# Patient Record
Sex: FEMALE | Race: ASIAN | NOT HISPANIC OR LATINO
[De-identification: names, ages, dates, MRNs, and addresses within clinical notes are randomized per-mention and may not be internally consistent; named-entity substitution may affect disease eponyms.]

---

## 2022-07-08 ENCOUNTER — NON-APPOINTMENT (OUTPATIENT)
Age: 27
End: 2022-07-08

## 2022-07-08 PROBLEM — Z00.00 ENCOUNTER FOR PREVENTIVE HEALTH EXAMINATION: Status: ACTIVE | Noted: 2022-07-08

## 2022-07-09 ENCOUNTER — NON-APPOINTMENT (OUTPATIENT)
Age: 27
End: 2022-07-09

## 2022-07-12 ENCOUNTER — OUTPATIENT (OUTPATIENT)
Dept: OUTPATIENT SERVICES | Facility: HOSPITAL | Age: 27
LOS: 1 days | End: 2022-07-12

## 2022-07-12 ENCOUNTER — RESULT REVIEW (OUTPATIENT)
Age: 27
End: 2022-07-12

## 2022-07-12 ENCOUNTER — APPOINTMENT (OUTPATIENT)
Dept: ULTRASOUND IMAGING | Facility: CLINIC | Age: 27
End: 2022-07-12

## 2022-07-12 PROCEDURE — 76856 US EXAM PELVIC COMPLETE: CPT | Mod: 26,59

## 2022-07-12 PROCEDURE — 76830 TRANSVAGINAL US NON-OB: CPT | Mod: 26

## 2022-07-15 PROBLEM — R19.00 PELVIC MASS: Status: ACTIVE | Noted: 2022-07-15

## 2022-07-18 ENCOUNTER — NON-APPOINTMENT (OUTPATIENT)
Age: 27
End: 2022-07-18

## 2022-07-19 ENCOUNTER — APPOINTMENT (OUTPATIENT)
Dept: GYNECOLOGIC ONCOLOGY | Facility: CLINIC | Age: 27
End: 2022-07-19

## 2022-07-19 VITALS
WEIGHT: 110 LBS | BODY MASS INDEX: 19.49 KG/M2 | SYSTOLIC BLOOD PRESSURE: 111 MMHG | DIASTOLIC BLOOD PRESSURE: 72 MMHG | HEIGHT: 63 IN | HEART RATE: 73 BPM

## 2022-07-19 DIAGNOSIS — R19.00 INTRA-ABDOMINAL AND PELVIC SWELLING, MASS AND LUMP, UNSPECIFIED SITE: ICD-10-CM

## 2022-07-19 DIAGNOSIS — R10.2 PELVIC AND PERINEAL PAIN: ICD-10-CM

## 2022-07-19 DIAGNOSIS — Z80.0 FAMILY HISTORY OF MALIGNANT NEOPLASM OF DIGESTIVE ORGANS: ICD-10-CM

## 2022-07-19 DIAGNOSIS — Z80.8 FAMILY HISTORY OF MALIGNANT NEOPLASM OF OTHER ORGANS OR SYSTEMS: ICD-10-CM

## 2022-07-19 PROCEDURE — 99204 OFFICE O/P NEW MOD 45 MIN: CPT

## 2022-07-19 RX ORDER — NORELGESTROMIN AND ETHINYL ESTRADIOL 150; 35 UG/D; UG/D
150-35 PATCH TRANSDERMAL
Refills: 0 | Status: ACTIVE | COMMUNITY

## 2022-07-26 ENCOUNTER — OUTPATIENT (OUTPATIENT)
Dept: OUTPATIENT SERVICES | Facility: HOSPITAL | Age: 27
LOS: 1 days | End: 2022-07-26

## 2022-07-26 VITALS
WEIGHT: 108.03 LBS | SYSTOLIC BLOOD PRESSURE: 100 MMHG | RESPIRATION RATE: 18 BRPM | HEART RATE: 62 BPM | OXYGEN SATURATION: 99 % | DIASTOLIC BLOOD PRESSURE: 68 MMHG | HEIGHT: 63 IN | TEMPERATURE: 99 F

## 2022-07-26 DIAGNOSIS — N83.209 UNSPECIFIED OVARIAN CYST, UNSPECIFIED SIDE: ICD-10-CM

## 2022-07-26 LAB
BLD GP AB SCN SERPL QL: NEGATIVE — SIGNIFICANT CHANGE UP
HCG UR QL: NEGATIVE — SIGNIFICANT CHANGE UP
HCT VFR BLD CALC: 40.6 % — SIGNIFICANT CHANGE UP (ref 34.5–45)
HGB BLD-MCNC: 12.2 G/DL — SIGNIFICANT CHANGE UP (ref 11.5–15.5)
MCHC RBC-ENTMCNC: 25.8 PG — LOW (ref 27–34)
MCHC RBC-ENTMCNC: 30 GM/DL — LOW (ref 32–36)
MCV RBC AUTO: 86 FL — SIGNIFICANT CHANGE UP (ref 80–100)
NRBC # BLD: 0 /100 WBCS — SIGNIFICANT CHANGE UP
NRBC # FLD: 0 K/UL — SIGNIFICANT CHANGE UP
PLATELET # BLD AUTO: 293 K/UL — SIGNIFICANT CHANGE UP (ref 150–400)
RBC # BLD: 4.72 M/UL — SIGNIFICANT CHANGE UP (ref 3.8–5.2)
RBC # FLD: 15.4 % — HIGH (ref 10.3–14.5)
RH IG SCN BLD-IMP: POSITIVE — SIGNIFICANT CHANGE UP
WBC # BLD: 6.83 K/UL — SIGNIFICANT CHANGE UP (ref 3.8–10.5)
WBC # FLD AUTO: 6.83 K/UL — SIGNIFICANT CHANGE UP (ref 3.8–10.5)

## 2022-07-26 RX ORDER — SODIUM CHLORIDE 9 MG/ML
1000 INJECTION, SOLUTION INTRAVENOUS
Refills: 0 | Status: DISCONTINUED | OUTPATIENT
Start: 2022-08-04 | End: 2022-08-18

## 2022-07-26 NOTE — H&P PST ADULT - HISTORY OF PRESENT ILLNESS
Patient is 26 year old female with preop dx of ovarian cyst . Patient is scheduled for robotic assisted laparoscopic left ovarian cystectomy dilation curettage.

## 2022-07-26 NOTE — H&P PST ADULT - ASSESSMENT
preop dx of ovarian cyst . Patient is scheduled for robotic assisted laparoscopic left ovarian cystectomy dilation curettage.

## 2022-07-26 NOTE — H&P PST ADULT - NEGATIVE FEMALE-SPECIFIC SYMPTOMS
no irregular menses/no vaginal discharge/no dysmenorrhea/no amenorrhea/no spotting/no abnormal vaginal bleeding/no pelvic pain/not applicable

## 2022-07-26 NOTE — H&P PST ADULT - PROBLEM SELECTOR PLAN 1
Patient tentatively scheduled for robotic assisted laparoscopic left ovarian cystectomy dilation curettage on 8/4/22    Pre-op instructions provided. Pt given verbal and written instructions with teach back on chlorhexidine shampoo and Pepcid. Pt verbalized understanding with return demonstration.    Pt has a scheduled preop COVID test.

## 2022-07-26 NOTE — H&P PST ADULT - NSICDXFAMILYHX_GEN_ALL_CORE_FT
FAMILY HISTORY:  Father  Still living? Unknown  FHx: stomach cancer, Age at diagnosis: Age Unknown

## 2022-07-31 ENCOUNTER — NON-APPOINTMENT (OUTPATIENT)
Age: 27
End: 2022-07-31

## 2022-08-03 ENCOUNTER — TRANSCRIPTION ENCOUNTER (OUTPATIENT)
Age: 27
End: 2022-08-03

## 2022-08-03 NOTE — ASU PATIENT PROFILE, ADULT - NSICDXPASTMEDICALHX_GEN_ALL_CORE_FT
PAST MEDICAL HISTORY:  Intra-abdominal and pelvic swelling, mass and lump, unspecified site     Ovarian cyst left

## 2022-08-04 ENCOUNTER — RESULT REVIEW (OUTPATIENT)
Age: 27
End: 2022-08-04

## 2022-08-04 ENCOUNTER — OUTPATIENT (OUTPATIENT)
Dept: OUTPATIENT SERVICES | Facility: HOSPITAL | Age: 27
LOS: 1 days | Discharge: HOME | End: 2022-08-04

## 2022-08-04 ENCOUNTER — APPOINTMENT (OUTPATIENT)
Dept: GYNECOLOGIC ONCOLOGY | Facility: HOSPITAL | Age: 27
End: 2022-08-04

## 2022-08-04 ENCOUNTER — TRANSCRIPTION ENCOUNTER (OUTPATIENT)
Age: 27
End: 2022-08-04

## 2022-08-04 VITALS
HEART RATE: 60 BPM | RESPIRATION RATE: 14 BRPM | WEIGHT: 108.03 LBS | OXYGEN SATURATION: 100 % | SYSTOLIC BLOOD PRESSURE: 106 MMHG | TEMPERATURE: 98 F | HEIGHT: 63 IN | DIASTOLIC BLOOD PRESSURE: 59 MMHG

## 2022-08-04 VITALS
OXYGEN SATURATION: 97 % | SYSTOLIC BLOOD PRESSURE: 101 MMHG | HEART RATE: 77 BPM | DIASTOLIC BLOOD PRESSURE: 66 MMHG | RESPIRATION RATE: 16 BRPM

## 2022-08-04 DIAGNOSIS — N83.209 UNSPECIFIED OVARIAN CYST, UNSPECIFIED SIDE: ICD-10-CM

## 2022-08-04 LAB — HCG UR QL: NEGATIVE — SIGNIFICANT CHANGE UP

## 2022-08-04 PROCEDURE — 58662 LAPAROSCOPY EXCISE LESIONS: CPT

## 2022-08-04 PROCEDURE — 88305 TISSUE EXAM BY PATHOLOGIST: CPT | Mod: 26,59

## 2022-08-04 PROCEDURE — 88305 TISSUE EXAM BY PATHOLOGIST: CPT | Mod: 26

## 2022-08-04 PROCEDURE — 88112 CYTOPATH CELL ENHANCE TECH: CPT | Mod: 26

## 2022-08-04 PROCEDURE — 88307 TISSUE EXAM BY PATHOLOGIST: CPT | Mod: 26

## 2022-08-04 PROCEDURE — 58120 DILATION AND CURETTAGE: CPT

## 2022-08-04 RX ORDER — HYDROMORPHONE HYDROCHLORIDE 2 MG/ML
0.5 INJECTION INTRAMUSCULAR; INTRAVENOUS; SUBCUTANEOUS
Refills: 0 | Status: DISCONTINUED | OUTPATIENT
Start: 2022-08-04 | End: 2022-08-04

## 2022-08-04 RX ORDER — NORELGESTROMIN AND ETHINYL ESTRADIOL 150; 35 UG/D; UG/D
1 PATCH TRANSDERMAL
Qty: 0 | Refills: 0 | DISCHARGE

## 2022-08-04 RX ORDER — IBUPROFEN 200 MG
1 TABLET ORAL
Qty: 0 | Refills: 0 | DISCHARGE

## 2022-08-04 RX ORDER — ACETAMINOPHEN 500 MG
2 TABLET ORAL
Qty: 0 | Refills: 0 | DISCHARGE

## 2022-08-04 RX ORDER — OXYCODONE HYDROCHLORIDE 5 MG/1
1 TABLET ORAL
Qty: 4 | Refills: 0
Start: 2022-08-04

## 2022-08-04 RX ORDER — SODIUM CHLORIDE 9 MG/ML
1000 INJECTION, SOLUTION INTRAVENOUS
Refills: 0 | Status: DISCONTINUED | OUTPATIENT
Start: 2022-08-04 | End: 2022-08-18

## 2022-08-04 RX ORDER — ONDANSETRON 8 MG/1
4 TABLET, FILM COATED ORAL ONCE
Refills: 0 | Status: COMPLETED | OUTPATIENT
Start: 2022-08-04 | End: 2022-08-04

## 2022-08-04 RX ORDER — OXYCODONE HYDROCHLORIDE 5 MG/1
5 TABLET ORAL ONCE
Refills: 0 | Status: DISCONTINUED | OUTPATIENT
Start: 2022-08-04 | End: 2022-08-04

## 2022-08-04 RX ADMIN — HYDROMORPHONE HYDROCHLORIDE 0.5 MILLIGRAM(S): 2 INJECTION INTRAMUSCULAR; INTRAVENOUS; SUBCUTANEOUS at 14:42

## 2022-08-04 RX ADMIN — SODIUM CHLORIDE 125 MILLILITER(S): 9 INJECTION, SOLUTION INTRAVENOUS at 14:47

## 2022-08-04 RX ADMIN — HYDROMORPHONE HYDROCHLORIDE 0.5 MILLIGRAM(S): 2 INJECTION INTRAMUSCULAR; INTRAVENOUS; SUBCUTANEOUS at 15:38

## 2022-08-04 RX ADMIN — OXYCODONE HYDROCHLORIDE 5 MILLIGRAM(S): 5 TABLET ORAL at 15:38

## 2022-08-04 RX ADMIN — OXYCODONE HYDROCHLORIDE 5 MILLIGRAM(S): 5 TABLET ORAL at 14:44

## 2022-08-04 RX ADMIN — ONDANSETRON 4 MILLIGRAM(S): 8 TABLET, FILM COATED ORAL at 14:47

## 2022-08-04 NOTE — BRIEF OPERATIVE NOTE - NSICDXBRIEFPROCEDURE_GEN_ALL_CORE_FT
PROCEDURES:  D&C (dilatation and curettage, scraping of uterus) 04-Aug-2022 14:20:46  Bernie Eller  Robot-assisted left ovarian cystectomy 04-Aug-2022 14:21:11  Bernie Eller

## 2022-08-04 NOTE — ASU DISCHARGE PLAN (ADULT/PEDIATRIC) - FOLLOW UP APPOINTMENTS
may also call Recovery Room (PACU) 24/7 @ [phone]/[facility]

## 2022-08-04 NOTE — ASU DISCHARGE PLAN (ADULT/PEDIATRIC) - NS MD DC FALL RISK RISK
For information on Fall & Injury Prevention, visit: [email or web address] OR  [email or web address] OR  https://www.cdc.gov/steadi/patient.html

## 2022-08-04 NOTE — ASU DISCHARGE PLAN (ADULT/PEDIATRIC) - CARE PROVIDER_API CALL
Kathleen Chaparro)  Gynecologic Oncology; Obstetrics and Gynecology  [address] [place] [address]  Phone: [phone]  Fax: [phone]  Follow Up Time: 2 weeks

## 2022-08-04 NOTE — PROGRESS NOTE ADULT - ASSESSMENT
28 yo female s/p Robot-assisted left ovarian cystectomy and D&C in stable condition.  -LR@125  -Regular diet  -DTV by 8pm  -discharge home once ambulating and voiding without difficulty  -F/U with Dr. Chaparro in 2 weeks    Carlos Cary PA-C  #[id number]

## 2022-08-04 NOTE — BRIEF OPERATIVE NOTE - OPERATION/FINDINGS
EUA: Normal external female genitalia. Small mobile anteverted uterus, left adnexal/midline abdominal fullness appreciated.   Intrabdominal findings: Grossly normal appearing liver, omentum and bowel.  Uterus smooth in appearance. Grossly normal bilateral fallopian tubes. Left ovary with 10cm dermoid cyst containing hair, mucinous fluid. Normal right ovary.

## 2022-08-04 NOTE — ASU DISCHARGE PLAN (ADULT/PEDIATRIC) - NURSING INSTRUCTIONS
You received IV Tylenol for pain management at 1:15PM. Please DO NOT take any Tylenol (Acetaminophen) containing products, such as Vicodin, Percocet, Excedrin, and cold medications for the next 6 hours (until 7;15 PM). DO NOT TAKE MORE THAN 3000 MG OF TYLENOL in a 24 hour period. You received IV Tylenol for pain management at 1:15PM. Please DO NOT take any Tylenol (Acetaminophen) containing products, such as Vicodin, Percocet, Excedrin, and cold medications for the next 6 hours (until 7;15 PM). DO NOT TAKE MORE THAN 3000 MG OF TYLENOL in a 24 hour period.. When taking pain meds - take with food and know it may cause constipation and nausea - Do NOT drive while on narcotics. You received IV Tylenol for pain management at 1:15PM. Please DO NOT take any Tylenol (Acetaminophen) containing products, such as Vicodin, Percocet, Excedrin, and cold medications for the next 6 hours (until 7;15 PM). DO NOT TAKE MORE THAN 3000 MG OF TYLENOL in a 24 hour period.. You received a dose of Oxycodone at When taking pain meds - take with food and know it may cause constipation and nausea - Do NOT drive while on narcotics.

## 2022-08-04 NOTE — ASU DISCHARGE PLAN (ADULT/PEDIATRIC) - ASU DC SPECIAL INSTRUCTIONSFT
Postoperative Instructions      Pain control: For pain control, take the following   1. Motrin 600mg four times a day, take with food  2. Add Tylenol 975 four times a day, alternated with motrin  3. Add oxycodone as needed for severe pain not managed well by motrin and tylenol. A prescription has been sent to your pharmacy on file.     Motrin and Tylenol can be obtained over the counter.    Postoperative restrictions: Do not drive or make important decisions for 24 hours after anesthesia. You may feel drowsy for 24 hours and should have a responsible adult with you during that time. Nothing in the vagina (tampons, sexual intercourse), No tub baths, pools or hot tubs for 6 weeks (showers are ok!). No lifting anything heavier than 15 lbs, no strenuous exercise for 6 weeks after surgery. Do not pull or cut any stitches that you see around your incisions.    Vaginal bleeding: Spotting and intermittent passage of blood clots per vagina is normal in first few weeks after surgery. If you are soaking 1 pad per hour, that is not normal and you should notify Dr. Chaparro's office and seek medical attention right away.     Vaginal discharge: Vaginal discharge (all colors) is normal.     Signs of Infection: Some postoperative pain and discomfort is normal. However, if you experience any of the following, you may be developing an infection and should be seen by your doctor: pain that does not get better with the oral medications listed above, fever greater than 100.4F, foul smelling discharge coming from the surgical site, nausea and vomiting that does not stop (especially if you are unable to tolerate oral intake), or inability to urinate. If you experience any of these symptoms, call your doctor's office to be seen right away.

## 2022-08-04 NOTE — PROGRESS NOTE ADULT - SUBJECTIVE AND OBJECTIVE BOX
GYNECOLOGIC ONCOLOGY PA POST OP NOTE    Pt seen and examined at bedside, sitting up,  at bedside. Patient denies headache, dizziness, nausea, vomiting, chest pain and sob. Bonilla removed in OR->DTV. Patient is tolerating water/crackers.     OBJECTIVE:     VITALS:  T(F): 97.6 (08-04-22 @ 16:00), Max: 97.9 (08-04-22 @ 08:40)  HR: 73 (08-04-22 @ 17:00) (60 - 91)  BP: 98/59 (08-04-22 @ 17:00) (98/59 - 127/85)  RR: 16 (08-04-22 @ 17:00) (12 - 20)  SpO2: 98% (08-04-22 @ 17:00) (97% - 100%)  Wt(kg): --    I&O's Summary    04 Aug 2022 07:01  -  04 Aug 2022 17:41  --------------------------------------------------------  IN: 125 mL / OUT: 0 mL / NET: 125 mL        MEDICATIONS  (STANDING):  lactated ringers. 1000 milliLiter(s) (30 mL/Hr) IV Continuous <Continuous>  lactated ringers. 1000 milliLiter(s) (125 mL/Hr) IV Continuous <Continuous>    MEDICATIONS  (PRN):  HYDROmorphone  Injectable 0.5 milliGRAM(s) IV Push every 10 minutes PRN Severe Pain (7 - 10)      Physical Exam:  Constitutional: NAD  Pulmonary: clear to auscultation bilaterally   Cardiovascular: Regular rate and rhythm   Abdomen: soft, non-distended, appropriate tenderness, scope sites C/D/I  Extremities: no lower extremity edema or calve tenderness

## 2022-08-05 ENCOUNTER — NON-APPOINTMENT (OUTPATIENT)
Age: 27
End: 2022-08-05

## 2022-08-05 LAB — NON-GYNECOLOGICAL CYTOLOGY STUDY: SIGNIFICANT CHANGE UP

## 2022-08-11 LAB — SURGICAL PATHOLOGY STUDY: SIGNIFICANT CHANGE UP

## 2022-08-22 PROBLEM — N83.209 OVARIAN CYST: Status: ACTIVE | Noted: 2022-07-08

## 2022-08-24 ENCOUNTER — APPOINTMENT (OUTPATIENT)
Dept: GYNECOLOGIC ONCOLOGY | Facility: CLINIC | Age: 27
End: 2022-08-24

## 2022-08-24 VITALS — HEART RATE: 89 BPM | SYSTOLIC BLOOD PRESSURE: 115 MMHG | DIASTOLIC BLOOD PRESSURE: 77 MMHG

## 2022-08-24 DIAGNOSIS — N83.209 UNSPECIFIED OVARIAN CYST, UNSPECIFIED SIDE: ICD-10-CM

## 2022-08-24 PROCEDURE — 99024 POSTOP FOLLOW-UP VISIT: CPT

## 2023-02-15 NOTE — ASU DISCHARGE PLAN (ADULT/PEDIATRIC) - DISCHARGE PLAN IS COMPLETE AND GIVEN TO PATIENT
Patient comes in d/t lumbar back pain that started on Friday. Patient also reports a rash all over her body. Patient states the rash started 3 months ago. Patient able to stand at bedside at this time.    : Yes

## 2025-06-09 NOTE — H&P PST ADULT - FUNCTIONAL ASSESSMENT - BASIC MOBILITY ASSESSMENT TYPE
Patient called-she would like a copy of the form e-mailed to her and also sent to her [employer] and she will forward it to her . Verified email address    Completed form was uploaded to patient's [employer] portal.  Completed form was emailed to patient's e-mail address on file.    
Admission

## (undated) DEVICE — TUBING STRYKEFLOW II SUCTION / IRRIGATOR

## (undated) DEVICE — GLV 6.5 PROTEXIS W HYDROGEL

## (undated) DEVICE — CANISTER SUCTION 2000CC

## (undated) DEVICE — PACK PERI GYN

## (undated) DEVICE — BASIN SET DOUBLE

## (undated) DEVICE — SOL IRR POUR H2O 250ML

## (undated) DEVICE — PREP BETADINE SPONGE STICKS

## (undated) DEVICE — XI TIP COVER

## (undated) DEVICE — XI DRAPE ARM

## (undated) DEVICE — SPECIMEN CONTAINER 100ML

## (undated) DEVICE — DRAPE IRRIGATION POUCH 19X23"

## (undated) DEVICE — XI ARM GRASPER TIP UP FENESTRATED

## (undated) DEVICE — PREP CHLOROHEXIDINE 4% 118CC KIT

## (undated) DEVICE — XI ARM FORCEP MARYLAND BIPOLAR

## (undated) DEVICE — XI ARM DISSECTOR CURVED BIPOLAR 8MM

## (undated) DEVICE — SOL IRR POUR NS 0.9% 500ML

## (undated) DEVICE — DRAPE 3/4 SHEET 52X76"

## (undated) DEVICE — STAPLER SKIN VISI-STAT 35 WIDE

## (undated) DEVICE — DRAPE LIGHT HANDLE COVER (BLUE)

## (undated) DEVICE — PACK D&C

## (undated) DEVICE — SUT POLYSORB 4-0 P-12 UNDYED

## (undated) DEVICE — ENDOCATCH 10MM SPECIMEN POUCH

## (undated) DEVICE — UTERINE MANIPULATOR COOPER SURGICAL 5MM 33CM GREEN

## (undated) DEVICE — VACUUM CURETTE BERKLEY OLYMPUS STRAIGHT 16MM X 1/2"

## (undated) DEVICE — DRAPE LARGE SHEET 72X85"

## (undated) DEVICE — TROCAR SURGIQUEST AIRSEAL 8MMX100MM

## (undated) DEVICE — POSITIONER FOAM EGG CRATE ULNAR 2PCS (PINK)

## (undated) DEVICE — POSITIONER FOAM HEAD CRADLE (PINK)

## (undated) DEVICE — FOLEY TRAY 16FR 5CC LF UMETER CLOSED

## (undated) DEVICE — DRAPE UNDER BUTTOCKS W SCREEN

## (undated) DEVICE — UTERINE MANIPULATOR CONMED VCARE LG 37MM

## (undated) DEVICE — PACK ROBOTIC LIJ

## (undated) DEVICE — POSITIONER PURPLE ARM ONE STEP (LARGE)

## (undated) DEVICE — GOWN XL

## (undated) DEVICE — LUBRICATING JELLY ONESHOT 1.25OZ

## (undated) DEVICE — DRSG MASTISOL

## (undated) DEVICE — XI ARM NEEDLE DRIVER SUTURECUT MEGA 8MM

## (undated) DEVICE — XI DRAPE COLUMN

## (undated) DEVICE — D HELP - CLEARVIEW CLEARIFY SYSTEM

## (undated) DEVICE — VENODYNE/SCD SLEEVE CALF MEDIUM

## (undated) DEVICE — UTERINE MANIPULATOR CONMED VCARE MED 34MM

## (undated) DEVICE — MEDICATION LABELS W MARKER

## (undated) DEVICE — GOWN TRIMAX LG

## (undated) DEVICE — DRAPE TOWEL BLUE 17" X 24"

## (undated) DEVICE — CURETTE UTERINE VACURETTE STRAIGHT 11MM

## (undated) DEVICE — SUT POLYSORB 0 30" GS-23

## (undated) DEVICE — DRSG OPSITE 13.75 X 4"

## (undated) DEVICE — XI ARM FORCEP PROGRASP 8MM

## (undated) DEVICE — TUBING AIRSEAL TRI-LUMEN FILTERED

## (undated) DEVICE — XI ARM SCISSOR MONO CURVED

## (undated) DEVICE — GOWN XXXL

## (undated) DEVICE — VACUUM CURETTE BERKLEY OLYMPUS CURVED 7MM

## (undated) DEVICE — FOLEY TRAY 16FR LF URINE METER SURESTEP

## (undated) DEVICE — VACUUM CURETTE BERKLEY OLYMPUS STRAIGHT 12MM

## (undated) DEVICE — DRSG STERISTRIPS 0.5 X 4"

## (undated) DEVICE — XI OBTURATOR OPTICAL BLADELESS 8MM

## (undated) DEVICE — CURETTE UTERINE VACURETTE CURVED 8MM

## (undated) DEVICE — XI SEAL UNIV 5- 8 MM

## (undated) DEVICE — DRSG TELFA 3 X 8

## (undated) DEVICE — UTERINE MANIPULATOR CONMED VCARE SM 32MM

## (undated) DEVICE — VACUUM CURETTE BERKLEY OLYMPUS CURVED 9MM

## (undated) DEVICE — XI ARM FORCEP FENESTRATED BIPOLAR 8MM

## (undated) DEVICE — TUBING SUCTION 20FT

## (undated) DEVICE — XI ARM NEEDLE DRIVER LARGE

## (undated) DEVICE — VISITEC 4X4

## (undated) DEVICE — WARMING BLANKET UPPER ADULT

## (undated) DEVICE — DRAPE INSTRUMENT POUCH 6.75" X 11"

## (undated) DEVICE — TUBING UTERINE ASPIRATION 3/8" X 6FT W/O ADAPTER

## (undated) DEVICE — POSITIONER PINK PAD PIGAZZI SYSTEM